# Patient Record
Sex: FEMALE | Race: WHITE | NOT HISPANIC OR LATINO | Employment: OTHER | URBAN - NONMETROPOLITAN AREA
[De-identification: names, ages, dates, MRNs, and addresses within clinical notes are randomized per-mention and may not be internally consistent; named-entity substitution may affect disease eponyms.]

---

## 2023-09-28 PROBLEM — M54.50 LUMBAGO: Status: ACTIVE | Noted: 2023-09-28

## 2023-09-28 PROBLEM — M43.16 SPONDYLOLISTHESIS OF LUMBAR REGION: Status: ACTIVE | Noted: 2023-09-28

## 2023-09-28 PROBLEM — S22.000A COMPRESSION FRACTURE OF THORACIC VERTEBRA (MULTI): Status: ACTIVE | Noted: 2023-09-28

## 2023-09-28 PROBLEM — N28.1 RENAL CYST: Status: ACTIVE | Noted: 2023-09-28

## 2023-09-28 RX ORDER — LISINOPRIL 20 MG/1
1 TABLET ORAL DAILY
COMMUNITY

## 2023-09-28 RX ORDER — IBUPROFEN 600 MG/1
600 TABLET ORAL
COMMUNITY

## 2023-09-28 RX ORDER — HYDROCODONE BITARTRATE AND ACETAMINOPHEN 5; 325 MG/1; MG/1
1 TABLET ORAL EVERY 6 HOURS
COMMUNITY

## 2023-09-28 RX ORDER — CYCLOBENZAPRINE HCL 10 MG
10 TABLET ORAL 3 TIMES DAILY
COMMUNITY

## 2023-09-28 RX ORDER — CELECOXIB 200 MG/1
200 CAPSULE ORAL 2 TIMES DAILY PRN
COMMUNITY
Start: 2015-02-26

## 2023-10-03 ENCOUNTER — TREATMENT (OUTPATIENT)
Dept: PHYSICAL THERAPY | Facility: HOSPITAL | Age: 68
End: 2023-10-03
Payer: COMMERCIAL

## 2023-10-03 DIAGNOSIS — M54.50 LOW BACK PAIN, UNSPECIFIED: ICD-10-CM

## 2023-10-03 PROCEDURE — 97110 THERAPEUTIC EXERCISES: CPT | Mod: GP

## 2023-10-03 ASSESSMENT — PAIN SCALES - GENERAL: PAINLEVEL_OUTOF10: 3

## 2023-10-03 ASSESSMENT — PAIN - FUNCTIONAL ASSESSMENT: PAIN_FUNCTIONAL_ASSESSMENT: 0-10

## 2023-10-03 NOTE — LETTER
October 8, 2023      No Recipients    Patient: Raquel Mark   YOB: 1955   Date of Visit: 10/3/2023       Dear Dr. Montgomery Recipients:    As you may recall, we have been seeing Raquel Mark for physical therapy of ***.    For therapy to continue, Medicare requires monthly physician review of the treatment plan. Please review the attached summary of the patient's progress and our plan for continued therapy, and verify  that you agree therapy should continue by signing the attached document and sending it back to our office.    If you have any questions or concerns, please don't hesitate to call.         Sincerely,        Bird Hurt, PT          CC:   No Recipients          Physical Therapy Medicare Recertification    Diagnosis: ***  ICD-9 Code: ***  Referring practitioner: ***  Date of last MD visit: ***  Date of initial OT Visit: ***  Patient seen for *** sessions      Subjective:  Patient's response to therapy: ***    Objective:  Current condition: ***  Test measurement: ***    Assessment:  Summary/analysis of evaluation: ***  Progress toward previous goals: { goal progress:6958282710}    Plan:    Goals  Short-term goals (STG): ***  Timeframe: The patient will {MISC; PT UE SHORT-TERM GOALS (STG):08476}  Long-term goals (LTG): ***  Timeframe: { timeframe:0375884628}  Prognosis to achieve goals: {GOOD/FAIR/POOR:76574}    Treatment  Principle of treatment/treatment today: { principal of tx:5593449324}  Treatment time: { tx time:6723342667}    Plan  Treatment plan with rationale: { tx plan:4183287093}  Recommendations: {MISC; PT RECOMMENDATIONS:38909}  Reason for plan status: { reason:4516459534}    Follow Up  Follow up in {numbers; 0-10:90261} {time units:11}      Based upon review of the patient's progress and continued therapy plan, it is my medical opinion that Raquel Mark should continue physical therapy treatment at the [facility] [place]:    Signed:  _____________________________________________    {insert physician name}, MD

## 2023-10-03 NOTE — PROGRESS NOTES
Physical Therapy    Physical Therapy Reevaluation and Treatment      Patient Name: Raquel Mark  MRN: [id number]  Today's Date: 10/3/2023  Time Calculation  Start Time: 1300  Stop Time: 1341  Time Calculation (min): 41 min      Assessment: The patient met strength and ROM goals. Did not reach pain goal but feels she is highly variable and better than at time of evaluation. She feels she is able to manage her condition with continuation of HEP.       Plan: Discharge to [facility].  PT Plan: No Additional PT interventions required at this time    Current Problem:   1. Low back pain, unspecified  Follow Up In Physical Therapy          Subjective  The patient feels she has improved but will never be totally pain free.  General:  General  Past Medical History Relevant to Rehab: Patient presenting for recheck/continuation of PT.  Pain:  Pain Assessment  Pain Assessment: 0-10  Pain Score: 3  Pain Type: Chronic pain  Pain Location: Back  General Assessments:  Range of Motion Comments: Lumbar extension limited to mod deficit.       Outcome Measures:  Other Measures  Oswestry Disablity Index (JUSTINO): 30%     Treatments:  Therapeutic Exercise  Therapeutic Exercise Activity 1: Physiostep x 10  Therapeutic Exercise Activity 2: Philip hip extension 3x10  Therapeutic Exercise Activity 3: back extension 3x10  Therapeutic Exercise Activity 4: SKTC 3x30  Therapeutic Exercise Activity 5: DKTC 3x30  Therapeutic Exercise Activity 6: HS stretch 3x30  Therapeutic Exercise Activity 7: bridge 3x10  Therapeutic Exercise Activity 8: DB 3x30 sec  Therapeutic Exercise Activity 9: SL abduction 3x10  Therapeutic Exercise Activity 10: LTR 3x10  Goals:  Pain: Reduce LBP to <=2/10 to improve tolerance of homemaking tasks (cooking and light cleaning).   Range Of Motion/Joint Mobility: Increase lumbar extension to mod deficit if tolerated to improve ease of position changes.   Strength: Increase abdominal strength to allow 30 degrees of lumbar stabilized  leg lowering without pain increase >3/10   , Decrease Oswestry to <=25% to demo meaningful improvement.

## 2023-10-06 ENCOUNTER — DOCUMENTATION (OUTPATIENT)
Dept: PHYSICAL THERAPY | Facility: HOSPITAL | Age: 68
End: 2023-10-06
Payer: COMMERCIAL

## 2023-10-06 NOTE — PROGRESS NOTES
Physical Therapy    Discharge Summary    Name: Raquel Mark  MRN: [id number]  : 1955  Date: 10/06/23    Discharge Summary: PT    Discharge Information: Date of discharge 10/3/2023, Date of last visit 10/3/2023, Date of evaluation 2023, and Referred by Dr. Canas    Rehab Discharge Reason: Achieved all and/or the most significant goals(s)

## 2023-11-16 ENCOUNTER — HOSPITAL ENCOUNTER (OUTPATIENT)
Dept: RADIOLOGY | Facility: HOSPITAL | Age: 68
Discharge: HOME | End: 2023-11-16
Payer: COMMERCIAL

## 2023-11-16 DIAGNOSIS — M81.0 AGE-RELATED OSTEOPOROSIS WITHOUT CURRENT PATHOLOGICAL FRACTURE: ICD-10-CM

## 2023-11-16 DIAGNOSIS — Z12.31 ENCOUNTER FOR SCREENING MAMMOGRAM FOR MALIGNANT NEOPLASM OF BREAST: ICD-10-CM

## 2023-11-16 PROCEDURE — 77067 SCR MAMMO BI INCL CAD: CPT | Performed by: STUDENT IN AN ORGANIZED HEALTH CARE EDUCATION/TRAINING PROGRAM

## 2023-11-16 PROCEDURE — 77063 BREAST TOMOSYNTHESIS BI: CPT | Performed by: STUDENT IN AN ORGANIZED HEALTH CARE EDUCATION/TRAINING PROGRAM

## 2023-11-16 PROCEDURE — 77067 SCR MAMMO BI INCL CAD: CPT

## 2023-11-16 PROCEDURE — 77085 DXA BONE DENSITY AXL VRT FX: CPT | Performed by: RADIOLOGY

## 2023-11-16 PROCEDURE — 77085 DXA BONE DENSITY AXL VRT FX: CPT

## 2024-08-26 ENCOUNTER — HOSPITAL ENCOUNTER (OUTPATIENT)
Dept: RADIOLOGY | Facility: HOSPITAL | Age: 69
Discharge: HOME | End: 2024-08-26
Payer: COMMERCIAL

## 2024-08-26 DIAGNOSIS — M53.3 SACROILIAC PAIN: ICD-10-CM

## 2024-08-26 PROCEDURE — 72110 X-RAY EXAM L-2 SPINE 4/>VWS: CPT | Performed by: RADIOLOGY

## 2024-08-26 PROCEDURE — 72110 X-RAY EXAM L-2 SPINE 4/>VWS: CPT

## 2024-08-26 PROCEDURE — 72170 X-RAY EXAM OF PELVIS: CPT | Performed by: RADIOLOGY

## 2024-08-26 PROCEDURE — 72170 X-RAY EXAM OF PELVIS: CPT
